# Patient Record
Sex: MALE | Race: BLACK OR AFRICAN AMERICAN | URBAN - METROPOLITAN AREA
[De-identification: names, ages, dates, MRNs, and addresses within clinical notes are randomized per-mention and may not be internally consistent; named-entity substitution may affect disease eponyms.]

---

## 2024-03-06 ENCOUNTER — OV NP (OUTPATIENT)
Dept: URBAN - METROPOLITAN AREA CLINIC 92 | Facility: CLINIC | Age: 64
End: 2024-03-06
Payer: MEDICARE

## 2024-03-06 VITALS
SYSTOLIC BLOOD PRESSURE: 122 MMHG | TEMPERATURE: 96.9 F | WEIGHT: 171 LBS | HEIGHT: 72 IN | HEART RATE: 77 BPM | BODY MASS INDEX: 23.16 KG/M2 | DIASTOLIC BLOOD PRESSURE: 79 MMHG

## 2024-03-06 DIAGNOSIS — R19.7 DIARRHEA, UNSPECIFIED TYPE: ICD-10-CM

## 2024-03-06 DIAGNOSIS — R15.9 FULL INCONTINENCE OF FECES: ICD-10-CM

## 2024-03-06 PROBLEM — 1086911000119107: Status: ACTIVE | Noted: 2024-03-06

## 2024-03-06 PROCEDURE — 99204 OFFICE O/P NEW MOD 45 MIN: CPT

## 2024-03-06 NOTE — HPI-TODAY'S VISIT:
63-year-old male presents today due to bowel incontinence and loose stool.  He has been following with [facility] but would like to switch providers.  He states that he has issues with frequent bowel movements for many years and more recently last couple of months this has been worsening.  He has about 2 to 3-minute warning before he has to have a bowel movement. He has around 3-4 BM Daily with no nightime sx. He has had incontinence episodes about 2 times a month due to the urgency.  He states that anytime he gets into the car he has a feeling of urgency and has to have a bowel movement.  This is caused a lot of anxiety and stress in his life.  He thinks that anxiety has worsened his symptoms. No new meds or abx since onset of sx. He thinks dairy can worsen his sx. He does note occasional blood when he wipes.  He does have some lower abdominal cramping that goes away after bowel movement.  No associated nausea, vomiting, fever, chills no upper GI complaints. His last colonoscopy was 3-2023 that demonstrated fair prep, diverticulosis in sigmoid, 6 hyperplastic polyps.  This was done with Dr. Manuel Bourne who follows patient for genital warts.  He has no family history of any GI related cancers or conditions. He did have a anorectal manometry test with [facility] that demonstrated low resting tone and low squeeze duration, paradoxical increase in anal pressures on stimulate defecation suggest pelvic floor dysfunction, absent pain no rectal inhibitory reflex.  Recommend pelvic floor therapy with neurosensory feedback and consider a endoanal ultrasound if no improvement. He was told to start fiber by Dr. Bourne but has not tried this yet. [place] records reveal patient has not had testing for celiac, thyroid dysfunction or pancreatic insufficiency He does have history of HIV currently managed on Biktarvy last CD4 count 1400.  CBC demonstrated no anemia.

## 2024-03-07 LAB
IMMUNOGLOBULIN A, QN, SERUM: 204
INTERPRETATION: (no result)
T-TRANSGLUTAMINASE (TTG) IGA: <1
TSH W/REFLEX TO FT4: 1.9

## 2024-03-15 ENCOUNTER — LAB (OUTPATIENT)
Dept: URBAN - METROPOLITAN AREA CLINIC 92 | Facility: CLINIC | Age: 64
End: 2024-03-15

## 2024-03-15 LAB — PANCREATIC ELASTASE, FECAL: 31

## 2024-05-01 ENCOUNTER — OFFICE VISIT (OUTPATIENT)
Dept: URBAN - METROPOLITAN AREA CLINIC 92 | Facility: CLINIC | Age: 64
End: 2024-05-01

## 2024-05-01 RX ORDER — PANCRELIPASE 36000; 180000; 114000 [USP'U]/1; [USP'U]/1; [USP'U]/1
AS DIRECTED CAPSULE, DELAYED RELEASE PELLETS ORAL
Qty: 800 | Refills: 1 | Status: ACTIVE | COMMUNITY
Start: 2024-03-15

## 2024-05-22 ENCOUNTER — OFFICE VISIT (OUTPATIENT)
Dept: URBAN - METROPOLITAN AREA CLINIC 92 | Facility: CLINIC | Age: 64
End: 2024-05-22
Payer: MEDICARE

## 2024-05-22 ENCOUNTER — DASHBOARD ENCOUNTERS (OUTPATIENT)
Age: 64
End: 2024-05-22

## 2024-05-22 VITALS
BODY MASS INDEX: 22.62 KG/M2 | SYSTOLIC BLOOD PRESSURE: 121 MMHG | WEIGHT: 167 LBS | TEMPERATURE: 98.1 F | HEART RATE: 73 BPM | DIASTOLIC BLOOD PRESSURE: 77 MMHG | HEIGHT: 72 IN

## 2024-05-22 DIAGNOSIS — K86.81 EXOCRINE PANCREATIC INSUFFICIENCY: ICD-10-CM

## 2024-05-22 DIAGNOSIS — R15.9 FULL INCONTINENCE OF FECES: ICD-10-CM

## 2024-05-22 DIAGNOSIS — D49.0 IPMN (INTRADUCTAL PAPILLARY MUCINOUS NEOPLASM): ICD-10-CM

## 2024-05-22 PROCEDURE — 99213 OFFICE O/P EST LOW 20 MIN: CPT

## 2024-05-22 RX ORDER — PRAVASTATIN SODIUM 10 MG/1
2 TABLETS TABLET ORAL ONCE A DAY
Status: ACTIVE | COMMUNITY

## 2024-05-22 RX ORDER — PANCRELIPASE 36000; 180000; 114000 [USP'U]/1; [USP'U]/1; [USP'U]/1
AS DIRECTED CAPSULE, DELAYED RELEASE PELLETS ORAL
Qty: 800 | Refills: 1 | Status: ACTIVE | COMMUNITY
Start: 2024-03-15

## 2024-07-24 ENCOUNTER — TELEPHONE ENCOUNTER (OUTPATIENT)
Dept: URBAN - METROPOLITAN AREA CLINIC 92 | Facility: CLINIC | Age: 64
End: 2024-07-24

## 2024-07-30 ENCOUNTER — OFFICE VISIT (OUTPATIENT)
Dept: URBAN - METROPOLITAN AREA CLINIC 92 | Facility: CLINIC | Age: 64
End: 2024-07-30
Payer: MEDICARE

## 2024-07-30 VITALS
TEMPERATURE: 97.9 F | BODY MASS INDEX: 22.62 KG/M2 | WEIGHT: 167 LBS | HEIGHT: 72 IN | DIASTOLIC BLOOD PRESSURE: 81 MMHG | SYSTOLIC BLOOD PRESSURE: 124 MMHG | HEART RATE: 67 BPM

## 2024-07-30 DIAGNOSIS — K86.81 EXOCRINE PANCREATIC INSUFFICIENCY: ICD-10-CM

## 2024-07-30 DIAGNOSIS — R15.9 FULL INCONTINENCE OF FECES: ICD-10-CM

## 2024-07-30 DIAGNOSIS — D49.0 IPMN (INTRADUCTAL PAPILLARY MUCINOUS NEOPLASM): ICD-10-CM

## 2024-07-30 PROCEDURE — 99213 OFFICE O/P EST LOW 20 MIN: CPT

## 2024-07-30 RX ORDER — PRAVASTATIN SODIUM 10 MG/1
2 TABLETS TABLET ORAL ONCE A DAY
COMMUNITY

## 2024-07-30 RX ORDER — PANCRELIPASE 36000; 180000; 114000 [USP'U]/1; [USP'U]/1; [USP'U]/1
AS DIRECTED CAPSULE, DELAYED RELEASE PELLETS ORAL
Qty: 800 | Refills: 1 | Status: ACTIVE | COMMUNITY
Start: 2024-03-15

## 2024-07-30 RX ORDER — PANCRELIPASE LIPASE, PANCRELIPASE PROTEASE, PANCRELIPASE AMYLASE 252600; 60000; 189600 [USP'U]/1; [USP'U]/1; [USP'U]/1
TAKE ONE CAPSULE WITH MEAL AND ONE WITH SNACK CAPSULE, DELAYED RELEASE ORAL ONCE A DAY
Qty: 500 | Refills: 3 | OUTPATIENT
Start: 2024-07-30 | End: 2025-07-25

## 2024-07-30 NOTE — HPI-TODAY'S VISIT:
3/6/24  64-year-old male presents today due to bowel incontinence and loose stool.  He has been following with [facility] but would like to switch providers.  He states that he has issues with frequent bowel movements for many years and more recently last couple of months this has been worsening.  He has about 2 to 3-minute warning before he has to have a bowel movement. He has around 3-4 BM Daily with no nightime sx. He has had incontinence episodes about 2 times a month due to the urgency.  He states that anytime he gets into the car he has a feeling of urgency and has to have a bowel movement.  This is caused a lot of anxiety and stress in his life.  He thinks that anxiety has worsened his symptoms. No new meds or abx since onset of sx. He thinks dairy can worsen his sx. He does note occasional blood when he wipes.  He does have some lower abdominal cramping that goes away after bowel movement.  No associated nausea, vomiting, fever, chills no upper GI complaints. His last colonoscopy was 3-2023 that demonstrated fair prep, diverticulosis in sigmoid, 6 hyperplastic polyps.  This was done with Dr. Manuel Bourne who follows patient for genital warts.  He has no family history of any GI related cancers or conditions. He did have a anorectal manometry test with [facility] that demonstrated low resting tone and low squeeze duration, paradoxical increase in anal pressures on stimulate defecation suggest pelvic floor dysfunction, absent pain no rectal inhibitory reflex.  Recommend pelvic floor therapy with neurosensory feedback and consider a endoanal ultrasound if no improvement. He was told to start fiber by Dr. Bourne but has not tried this yet. [place] records reveal patient has not had testing for celiac, thyroid dysfunction or pancreatic insufficiency He does have history of HIV currently managed on Biktarvy last CD4 count 1400.  CBC demonstrated no anemia.  5/22/24 After last visit we obtained stool studies that demonstrated pancreatic elastase 31, normal TSH and celiac.  Patient was sent in Creon and has been on this for around 6 weeks. Feels like his stool are firmer but still has freq BM and also incontinence episodes. He contacted PFT around [place] most do not take insurance. He did not start Fiber yet. Of note per record review patient has h/o anal cancer that was tx with radiation in 2005. This is likely cause if his incontinence.  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  Has been on Creon with some improvement. Still no luck with PFT due to insurance issues. Has added on imodium to see if this helps per Dr Faust recommendation.

## 2024-08-28 ENCOUNTER — TELEPHONE ENCOUNTER (OUTPATIENT)
Dept: URBAN - METROPOLITAN AREA CLINIC 92 | Facility: CLINIC | Age: 64
End: 2024-08-28

## 2024-10-29 ENCOUNTER — TELEPHONE ENCOUNTER (OUTPATIENT)
Dept: URBAN - METROPOLITAN AREA CLINIC 92 | Facility: CLINIC | Age: 64
End: 2024-10-29

## 2024-10-29 ENCOUNTER — OFFICE VISIT (OUTPATIENT)
Dept: URBAN - METROPOLITAN AREA CLINIC 92 | Facility: CLINIC | Age: 64
End: 2024-10-29
Payer: MEDICARE

## 2024-10-29 VITALS
SYSTOLIC BLOOD PRESSURE: 123 MMHG | HEIGHT: 72 IN | BODY MASS INDEX: 22.62 KG/M2 | DIASTOLIC BLOOD PRESSURE: 78 MMHG | WEIGHT: 167 LBS | TEMPERATURE: 98.6 F | HEART RATE: 79 BPM

## 2024-10-29 DIAGNOSIS — R15.9 FULL INCONTINENCE OF FECES: ICD-10-CM

## 2024-10-29 DIAGNOSIS — K86.81 EXOCRINE PANCREATIC INSUFFICIENCY: ICD-10-CM

## 2024-10-29 DIAGNOSIS — D49.0 IPMN (INTRADUCTAL PAPILLARY MUCINOUS NEOPLASM): ICD-10-CM

## 2024-10-29 PROCEDURE — 99213 OFFICE O/P EST LOW 20 MIN: CPT

## 2024-10-29 RX ORDER — PRAVASTATIN SODIUM 10 MG/1
2 TABLETS TABLET ORAL ONCE A DAY
Status: ON HOLD | COMMUNITY

## 2024-10-29 RX ORDER — PANCRELIPASE LIPASE, PANCRELIPASE PROTEASE, PANCRELIPASE AMYLASE 252600; 60000; 189600 [USP'U]/1; [USP'U]/1; [USP'U]/1
TAKE ONE CAPSULE WITH MEAL AND ONE WITH SNACK CAPSULE, DELAYED RELEASE ORAL ONCE A DAY
Qty: 500 | Refills: 3 | OUTPATIENT

## 2024-10-29 RX ORDER — PANCRELIPASE LIPASE, PANCRELIPASE PROTEASE, PANCRELIPASE AMYLASE 252600; 60000; 189600 [USP'U]/1; [USP'U]/1; [USP'U]/1
TAKE ONE CAPSULE WITH MEAL AND ONE WITH SNACK CAPSULE, DELAYED RELEASE ORAL ONCE A DAY
Qty: 500 | Refills: 3 | Status: ACTIVE | COMMUNITY
Start: 2024-07-30 | End: 2025-07-25

## 2024-10-29 NOTE — HPI-TODAY'S VISIT:
3/6/24  64-year-old male presents today due to bowel incontinence and loose stool.  He has been following with [facility] but would like to switch providers.  He states that he has issues with frequent bowel movements for many years and more recently last couple of months this has been worsening.  He has about 2 to 3-minute warning before he has to have a bowel movement. He has around 3-4 BM Daily with no nightime sx. He has had incontinence episodes about 2 times a month due to the urgency.  He states that anytime he gets into the car he has a feeling of urgency and has to have a bowel movement.  This is caused a lot of anxiety and stress in his life.  He thinks that anxiety has worsened his symptoms. No new meds or abx since onset of sx. He thinks dairy can worsen his sx. He does note occasional blood when he wipes.  He does have some lower abdominal cramping that goes away after bowel movement.  No associated nausea, vomiting, fever, chills no upper GI complaints. His last colonoscopy was 3-2023 that demonstrated fair prep, diverticulosis in sigmoid, 6 hyperplastic polyps.  This was done with Dr. Manuel Bourne who follows patient for genital warts.  He has no family history of any GI related cancers or conditions. He did have a anorectal manometry test with [facility] that demonstrated low resting tone and low squeeze duration, paradoxical increase in anal pressures on stimulate defecation suggest pelvic floor dysfunction, absent pain no rectal inhibitory reflex.  Recommend pelvic floor therapy with neurosensory feedback and consider a endoanal ultrasound if no improvement. He was told to start fiber by Dr. Bourne but has not tried this yet. [place] records reveal patient has not had testing for celiac, thyroid dysfunction or pancreatic insufficiency He does have history of HIV currently managed on Biktarvy last CD4 count 1400.  CBC demonstrated no anemia.  5/22/24 After last visit we obtained stool studies that demonstrated pancreatic elastase 31, normal TSH and celiac.  Patient was sent in Creon and has been on this for around 6 weeks. Feels like his stool are firmer but still has freq BM and also incontinence episodes. He contacted PFT around [place] most do not take insurance. He did not start Fiber yet. Of note per record review patient has h/o anal cancer that was tx with radiation in 2005. This is likely cause if his incontinence.  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  Has been on Creon with some improvement. Still no luck with PFT due to insurance issues. Has added on imodium to see if this helps per Dr Faust recommendation.  10/29/24 He is on Zenpep 60k and when he remembers to take this it does help. He has been using Imodium when he is traveling or going out. He had another episode of incontinence on his way to [facility] last week. He lost the website for at home PFTs.

## 2024-12-04 ENCOUNTER — OFFICE VISIT (OUTPATIENT)
Dept: URBAN - METROPOLITAN AREA MEDICAL CENTER 28 | Facility: MEDICAL CENTER | Age: 64
End: 2024-12-04
Payer: MEDICARE

## 2024-12-04 DIAGNOSIS — R15.9 FECAL INCONTINENCE: ICD-10-CM

## 2024-12-04 PROCEDURE — 45341 SIGMOIDOSCOPY W/ULTRASOUND: CPT | Performed by: INTERNAL MEDICINE

## 2024-12-04 RX ORDER — PANCRELIPASE LIPASE, PANCRELIPASE PROTEASE, PANCRELIPASE AMYLASE 252600; 60000; 189600 [USP'U]/1; [USP'U]/1; [USP'U]/1
TAKE ONE CAPSULE WITH MEAL AND ONE WITH SNACK CAPSULE, DELAYED RELEASE ORAL ONCE A DAY
Qty: 500 | Refills: 3 | Status: ACTIVE | COMMUNITY

## 2024-12-04 RX ORDER — PRAVASTATIN SODIUM 10 MG/1
2 TABLETS TABLET ORAL ONCE A DAY
Status: ON HOLD | COMMUNITY

## 2025-01-09 ENCOUNTER — OFFICE VISIT (OUTPATIENT)
Dept: URBAN - METROPOLITAN AREA CLINIC 92 | Facility: CLINIC | Age: 65
End: 2025-01-09

## 2025-01-09 RX ORDER — PRAVASTATIN SODIUM 10 MG/1
2 TABLETS TABLET ORAL ONCE A DAY
Status: ON HOLD | COMMUNITY

## 2025-01-09 RX ORDER — PANCRELIPASE LIPASE, PANCRELIPASE PROTEASE, PANCRELIPASE AMYLASE 252600; 60000; 189600 [USP'U]/1; [USP'U]/1; [USP'U]/1
TAKE ONE CAPSULE WITH MEAL AND ONE WITH SNACK CAPSULE, DELAYED RELEASE ORAL ONCE A DAY
Qty: 500 | Refills: 3 | Status: ACTIVE | COMMUNITY

## 2025-05-07 ENCOUNTER — TELEPHONE ENCOUNTER (OUTPATIENT)
Dept: URBAN - METROPOLITAN AREA CLINIC 92 | Facility: CLINIC | Age: 65
End: 2025-05-07